# Patient Record
Sex: MALE | Race: WHITE | Employment: STUDENT | URBAN - METROPOLITAN AREA
[De-identification: names, ages, dates, MRNs, and addresses within clinical notes are randomized per-mention and may not be internally consistent; named-entity substitution may affect disease eponyms.]

---
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
## 2021-01-03 ENCOUNTER — HOSPITAL ENCOUNTER (OUTPATIENT)
Age: 21
Discharge: HOME | End: 2021-01-03
Attending: EMERGENCY MEDICINE
Payer: COMMERCIAL

## 2021-01-03 VITALS
RESPIRATION RATE: 20 BRPM | DIASTOLIC BLOOD PRESSURE: 87 MMHG | OXYGEN SATURATION: 98 % | SYSTOLIC BLOOD PRESSURE: 118 MMHG | HEART RATE: 78 BPM | TEMPERATURE: 99 F

## 2021-01-03 DIAGNOSIS — Z20.822 EXPOSURE TO COVID-19 VIRUS: Primary | ICD-10-CM

## 2021-01-03 PROCEDURE — 99213 OFFICE O/P EST LOW 20 MIN: CPT

## 2021-01-03 PROCEDURE — 99203 OFFICE O/P NEW LOW 30 MIN: CPT

## 2021-01-05 LAB — SARS-COV-2 RNA,QUAL, RT-PCR: NOT DETECTED

## 2021-10-05 ENCOUNTER — IMMUNIZATION (OUTPATIENT)
Dept: LAB | Facility: HOSPITAL | Age: 21
End: 2021-10-05
Attending: EMERGENCY MEDICINE
Payer: COMMERCIAL

## 2021-10-05 ENCOUNTER — OFFICE VISIT (OUTPATIENT)
Dept: INTERNAL MEDICINE CLINIC | Facility: CLINIC | Age: 21
End: 2021-10-05

## 2021-10-05 VITALS
DIASTOLIC BLOOD PRESSURE: 72 MMHG | WEIGHT: 139 LBS | HEART RATE: 94 BPM | TEMPERATURE: 99 F | SYSTOLIC BLOOD PRESSURE: 100 MMHG | BODY MASS INDEX: 20.83 KG/M2 | HEIGHT: 68.5 IN | OXYGEN SATURATION: 98 %

## 2021-10-05 DIAGNOSIS — Z00.00 GENERAL MEDICAL EXAM: Primary | ICD-10-CM

## 2021-10-05 DIAGNOSIS — Z23 NEED FOR COVID-19 VACCINE: ICD-10-CM

## 2021-10-05 DIAGNOSIS — Z23 NEED FOR VACCINATION: Primary | ICD-10-CM

## 2021-10-05 DIAGNOSIS — F98.8 ATTENTION DEFICIT DISORDER, UNSPECIFIED HYPERACTIVITY PRESENCE: ICD-10-CM

## 2021-10-05 DIAGNOSIS — Z86.16 HISTORY OF COVID-19: ICD-10-CM

## 2021-10-05 PROCEDURE — 3078F DIAST BP <80 MM HG: CPT | Performed by: INTERNAL MEDICINE

## 2021-10-05 PROCEDURE — 85025 COMPLETE CBC W/AUTO DIFF WBC: CPT | Performed by: INTERNAL MEDICINE

## 2021-10-05 PROCEDURE — 80061 LIPID PANEL: CPT | Performed by: INTERNAL MEDICINE

## 2021-10-05 PROCEDURE — 99395 PREV VISIT EST AGE 18-39: CPT | Performed by: INTERNAL MEDICINE

## 2021-10-05 PROCEDURE — 84443 ASSAY THYROID STIM HORMONE: CPT | Performed by: INTERNAL MEDICINE

## 2021-10-05 PROCEDURE — 0001A SARSCOV2 VAC 30MCG/0.3ML IM: CPT

## 2021-10-05 PROCEDURE — 80053 COMPREHEN METABOLIC PANEL: CPT | Performed by: INTERNAL MEDICINE

## 2021-10-05 PROCEDURE — 3008F BODY MASS INDEX DOCD: CPT | Performed by: INTERNAL MEDICINE

## 2021-10-05 PROCEDURE — 3074F SYST BP LT 130 MM HG: CPT | Performed by: INTERNAL MEDICINE

## 2021-10-05 NOTE — PROGRESS NOTES
HPI:    Patient ID: Tawana Mills is a 22year old male.     HPI      Concern of ADD    !  b  2 Cc    Grades ok    Loss weight. covid 1/21  Not able to taste    No taste    Pizza taste horrible    needs MVI     Plays basketball 3-4 c     BM normal      No Referrals:  None            Patient affirmed understanding of plan and all questions were answered  This billing was  spent on reviewing old records, labs, medications, radiology tests  and decision making.   Appropriate medical decision-making and tests ar

## 2021-10-05 NOTE — PROGRESS NOTES
Jhoana Masters is a 22year old male who presents for a complete physical exam.     HPI:       Emmitt Boas  is concerned of attention deficit disorder. His grades in college had been Bs and Cs. He endorses lack of concentration and easily gets distracted.   No pro feels well otherwise  SKIN: denies any unusual skin lesions  EYES:denies blurred vision or double vision  HEENT: denies nasal congestion, sinus pain , poor taste  LUNGS: denies shortness of breath with exertion  CARDIOVASCULAR: denies chest pain on exertio asked to return fo f/u in 6 mos and  CPX in 1 year.

## 2021-10-06 PROBLEM — F98.8 ATTENTION DEFICIT DISORDER: Status: ACTIVE | Noted: 2021-10-06

## 2021-10-06 PROBLEM — Z86.16 HISTORY OF COVID-19: Status: ACTIVE | Noted: 2021-10-06

## 2021-10-06 NOTE — PATIENT INSTRUCTIONS
PATIENT INSTRUCTIONS – INITIAL PFIZER VACCINE DOSE      Next Steps     Second Dose Appointment    Congratulations on receiving your first dose of the Karan Hernandez COVID-19 vaccine. Your second vaccine dose is due 21 days from the first dose.   Please make that f Authorization (EUA) of the Pfizer-BioNTech COVID-19 vaccine to prevent coronavirus Disease 2019 (COVID-19).     [email or web address]    Participation in Post Vaccine Monitoring & Reporting     You received the Pfizer-BioNTech COVID-19 v someone with COVID-19  • Following any applicable workplace or school guidance  • Public health recommendations for vaccinated persons    Doretha      Aerobic Exercise for a Healthy Heart  Exercise is a lot more Always follow your healthcare professional's instructions.

## 2021-10-26 ENCOUNTER — IMMUNIZATION (OUTPATIENT)
Dept: LAB | Facility: HOSPITAL | Age: 21
End: 2021-10-26
Attending: EMERGENCY MEDICINE
Payer: COMMERCIAL

## 2021-10-26 DIAGNOSIS — Z23 NEED FOR VACCINATION: Primary | ICD-10-CM

## 2021-10-26 PROCEDURE — 0002A SARSCOV2 VAC 30MCG/0.3ML IM: CPT

## 2022-01-01 NOTE — ED PROVIDER NOTES
Patient Seen in: [facility]      History   Patient presents with:  Testing    Stated Complaint: Covid 19 test    HPI/Subjective:   HPI    25 yo male with close exposure to COVID in a household contact. He is asymptomatic.      Objective:   Pas General: Skin is warm and dry. Capillary Refill: Capillary refill takes less than 2 seconds. Neurological:      General: No focal deficit present. Mental Status: He is alert. Sensory: No sensory deficit.    Psychiatric:         Mood and Aff Normal vision: sees adequately in most situations; can see medication labels, newsprint

## 2022-05-17 ENCOUNTER — HOSPITAL ENCOUNTER (OUTPATIENT)
Age: 22
Discharge: HOME | End: 2022-05-17
Attending: EMERGENCY MEDICINE
Payer: COMMERCIAL

## 2022-05-17 VITALS
OXYGEN SATURATION: 100 % | SYSTOLIC BLOOD PRESSURE: 122 MMHG | TEMPERATURE: 98 F | HEART RATE: 66 BPM | RESPIRATION RATE: 16 BRPM | DIASTOLIC BLOOD PRESSURE: 59 MMHG

## 2022-05-17 DIAGNOSIS — J02.9 VIRAL PHARYNGITIS: Primary | ICD-10-CM

## 2022-05-17 LAB
S PYO AG THROAT QL: NEGATIVE
SARS-COV-2 RNA RESP QL NAA+PROBE: NOT DETECTED

## 2022-05-17 PROCEDURE — 99213 OFFICE O/P EST LOW 20 MIN: CPT

## 2022-05-17 PROCEDURE — 87880 STREP A ASSAY W/OPTIC: CPT

## 2022-07-19 ENCOUNTER — TELEPHONE (OUTPATIENT)
Dept: PEDIATRICS CLINIC | Facility: CLINIC | Age: 22
End: 2022-07-19

## 2022-07-19 NOTE — TELEPHONE ENCOUNTER
Concussion office visit with Dr Kimmy Gonsalez 10/20/2015 ? Call attempt to parent. No answer.  Voicemail is full, not accepting new messages

## 2022-07-19 NOTE — TELEPHONE ENCOUNTER
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  Please advise

## 2022-07-22 NOTE — TELEPHONE ENCOUNTER
To Dr. Harrison Brewer for review, patient requesting letter stating that he had a concussion 10/2015    Last [address] 1/8/019 with [facility]    Mom contacted regarding phone room staff message   Mom states patient will be entering the [address] [place] [address] in the next few weeks  Mom signed consent form for the [employer] to be able to access patient's medical records through \"[facility]\"   [address] [place] [address] is telling mom patient has records for \"concussion\" from 11/5/2017    Patient seen in office on 10/20/2015 for concussion  Sports physical from walk-in clinic from 11/5/2017 has concussion listed under [address]; no other encounters for 11/5/2017    Mom states [address] [place] [address] is requesting a note from Frank that patient was never seen in office on 11/5/2017 for a concussion, seen 10/20/2015 for concussion. Please review and advise - ok to create note?

## 2022-08-03 ENCOUNTER — TELEPHONE (OUTPATIENT)
Dept: INTERNAL MEDICINE CLINIC | Facility: CLINIC | Age: 22
End: 2022-08-03

## 2022-08-03 NOTE — TELEPHONE ENCOUNTER
Mother left msg stating patient is enlisting in the [employer] and needs forms to be fill out by pcp. Left voice msg in patient phone asking him to call the office in regards to msg above. Dr. Sanchez Varghese retired 04/2022 and last office visit was  10/05/2021 patient needs to establish care with new pcp.

## 2022-08-17 ENCOUNTER — OFFICE VISIT (OUTPATIENT)
Dept: INTERNAL MEDICINE CLINIC | Facility: CLINIC | Age: 22
End: 2022-08-17
Payer: COMMERCIAL

## 2022-08-17 ENCOUNTER — TELEPHONE (OUTPATIENT)
Dept: INTERNAL MEDICINE CLINIC | Facility: CLINIC | Age: 22
End: 2022-08-17

## 2022-08-17 VITALS
OXYGEN SATURATION: 98 % | SYSTOLIC BLOOD PRESSURE: 110 MMHG | HEART RATE: 62 BPM | TEMPERATURE: 98 F | WEIGHT: 146 LBS | BODY MASS INDEX: 21.38 KG/M2 | DIASTOLIC BLOOD PRESSURE: 70 MMHG | HEIGHT: 69.25 IN

## 2022-08-17 DIAGNOSIS — Z02.1 PHYSICAL EXAM, PRE-EMPLOYMENT: Primary | ICD-10-CM

## 2022-08-17 PROCEDURE — 99214 OFFICE O/P EST MOD 30 MIN: CPT | Performed by: INTERNAL MEDICINE

## 2022-08-17 PROCEDURE — 3074F SYST BP LT 130 MM HG: CPT | Performed by: INTERNAL MEDICINE

## 2022-08-17 PROCEDURE — 3078F DIAST BP <80 MM HG: CPT | Performed by: INTERNAL MEDICINE

## 2022-08-17 PROCEDURE — 3008F BODY MASS INDEX DOCD: CPT | Performed by: INTERNAL MEDICINE

## 2022-09-15 NOTE — TELEPHONE ENCOUNTER
Patient establishing care with Dr. Shayla Palma on 8/18/2022. Please change patients PCP from Dr. Ryne Jerez to Dr. Shayla Palma.

## 2022-12-17 ENCOUNTER — HOSPITAL ENCOUNTER (OUTPATIENT)
Age: 22
Discharge: HOME | End: 2022-12-17
Attending: EMERGENCY MEDICINE
Payer: COMMERCIAL

## 2022-12-17 ENCOUNTER — APPOINTMENT (OUTPATIENT)
Dept: GENERAL RADIOLOGY | Age: 22
End: 2022-12-17
Attending: EMERGENCY MEDICINE
Payer: COMMERCIAL

## 2022-12-17 VITALS
HEIGHT: 70 IN | HEART RATE: 68 BPM | BODY MASS INDEX: 21.47 KG/M2 | TEMPERATURE: 98 F | RESPIRATION RATE: 20 BRPM | OXYGEN SATURATION: 100 % | SYSTOLIC BLOOD PRESSURE: 122 MMHG | DIASTOLIC BLOOD PRESSURE: 62 MMHG | WEIGHT: 150 LBS

## 2022-12-17 DIAGNOSIS — S63.650A SPRAIN OF METACARPOPHALANGEAL (MCP) JOINT OF RIGHT INDEX FINGER, INITIAL ENCOUNTER: Primary | ICD-10-CM

## 2022-12-17 PROCEDURE — 99213 OFFICE O/P EST LOW 20 MIN: CPT

## 2022-12-17 PROCEDURE — 73140 X-RAY EXAM OF FINGER(S): CPT | Performed by: EMERGENCY MEDICINE

## 2022-12-17 NOTE — ED INITIAL ASSESSMENT (HPI)
Patient reports punching an object at the end of September. With continued right second finger pain. States pain increases with rom.

## 2023-05-08 ENCOUNTER — LAB ENCOUNTER (OUTPATIENT)
Dept: LAB | Facility: REFERENCE LAB | Age: 23
End: 2023-05-08
Attending: FAMILY MEDICINE
Payer: COMMERCIAL

## 2023-05-08 ENCOUNTER — OFFICE VISIT (OUTPATIENT)
Dept: INTERNAL MEDICINE CLINIC | Facility: CLINIC | Age: 23
End: 2023-05-08
Payer: COMMERCIAL

## 2023-05-08 VITALS
TEMPERATURE: 98 F | HEART RATE: 81 BPM | DIASTOLIC BLOOD PRESSURE: 74 MMHG | BODY MASS INDEX: 21.77 KG/M2 | OXYGEN SATURATION: 98 % | HEIGHT: 69 IN | SYSTOLIC BLOOD PRESSURE: 110 MMHG | WEIGHT: 147 LBS

## 2023-05-08 DIAGNOSIS — J30.2 SEASONAL ALLERGIC RHINITIS, UNSPECIFIED TRIGGER: ICD-10-CM

## 2023-05-08 DIAGNOSIS — Z00.00 WELLNESS EXAMINATION: ICD-10-CM

## 2023-05-08 DIAGNOSIS — Z00.00 WELLNESS EXAMINATION: Primary | ICD-10-CM

## 2023-05-08 LAB
ALBUMIN SERPL-MCNC: 4 G/DL (ref 3.4–5)
ALBUMIN/GLOB SERPL: 1.2 {RATIO} (ref 1–2)
ALP LIVER SERPL-CCNC: 80 U/L
ALT SERPL-CCNC: 30 U/L
ANION GAP SERPL CALC-SCNC: 3 MMOL/L (ref 0–18)
AST SERPL-CCNC: 22 U/L (ref 15–37)
BASOPHILS # BLD AUTO: 0.06 X10(3) UL (ref 0–0.2)
BASOPHILS NFR BLD AUTO: 1 %
BILIRUB SERPL-MCNC: 0.4 MG/DL (ref 0.1–2)
BILIRUB UR QL: NEGATIVE
BUN BLD-MCNC: 16 MG/DL (ref 7–18)
BUN/CREAT SERPL: 18.2 (ref 10–20)
CALCIUM BLD-MCNC: 9.1 MG/DL (ref 8.5–10.1)
CHLORIDE SERPL-SCNC: 106 MMOL/L (ref 98–112)
CHOLEST SERPL-MCNC: 135 MG/DL (ref ?–200)
CLARITY UR: CLEAR
CO2 SERPL-SCNC: 29 MMOL/L (ref 21–32)
CREAT BLD-MCNC: 0.88 MG/DL
DEPRECATED RDW RBC AUTO: 40.1 FL (ref 35.1–46.3)
EOSINOPHIL # BLD AUTO: 0.79 X10(3) UL (ref 0–0.7)
EOSINOPHIL NFR BLD AUTO: 13.3 %
ERYTHROCYTE [DISTWIDTH] IN BLOOD BY AUTOMATED COUNT: 12.2 % (ref 11–15)
EST. AVERAGE GLUCOSE BLD GHB EST-MCNC: 108 MG/DL (ref 68–126)
FASTING PATIENT LIPID ANSWER: NO
FASTING STATUS PATIENT QL REPORTED: NO
GFR SERPLBLD BASED ON 1.73 SQ M-ARVRAT: 125 ML/MIN/1.73M2 (ref 60–?)
GLOBULIN PLAS-MCNC: 3.3 G/DL (ref 2.8–4.4)
GLUCOSE BLD-MCNC: 87 MG/DL (ref 70–99)
GLUCOSE UR-MCNC: NORMAL MG/DL
HBA1C MFR BLD: 5.4 % (ref ?–5.7)
HCT VFR BLD AUTO: 43.4 %
HDLC SERPL-MCNC: 63 MG/DL (ref 40–59)
HGB BLD-MCNC: 14.5 G/DL
HGB UR QL STRIP.AUTO: NEGATIVE
IMM GRANULOCYTES # BLD AUTO: 0.01 X10(3) UL (ref 0–1)
IMM GRANULOCYTES NFR BLD: 0.2 %
KETONES UR-MCNC: NEGATIVE MG/DL
LDLC SERPL CALC-MCNC: 60 MG/DL (ref ?–100)
LEUKOCYTE ESTERASE UR QL STRIP.AUTO: NEGATIVE
LYMPHOCYTES # BLD AUTO: 1.79 X10(3) UL (ref 1–4)
LYMPHOCYTES NFR BLD AUTO: 30.1 %
MCH RBC QN AUTO: 29.7 PG (ref 26–34)
MCHC RBC AUTO-ENTMCNC: 33.4 G/DL (ref 31–37)
MCV RBC AUTO: 88.9 FL
MONOCYTES # BLD AUTO: 0.43 X10(3) UL (ref 0.1–1)
MONOCYTES NFR BLD AUTO: 7.2 %
NEUTROPHILS # BLD AUTO: 2.86 X10 (3) UL (ref 1.5–7.7)
NEUTROPHILS # BLD AUTO: 2.86 X10(3) UL (ref 1.5–7.7)
NEUTROPHILS NFR BLD AUTO: 48.2 %
NITRITE UR QL STRIP.AUTO: NEGATIVE
NONHDLC SERPL-MCNC: 72 MG/DL (ref ?–130)
OSMOLALITY SERPL CALC.SUM OF ELEC: 287 MOSM/KG (ref 275–295)
PH UR: 6 [PH] (ref 5–8)
PLATELET # BLD AUTO: 246 10(3)UL (ref 150–450)
POTASSIUM SERPL-SCNC: 3.9 MMOL/L (ref 3.5–5.1)
PROT SERPL-MCNC: 7.3 G/DL (ref 6.4–8.2)
PROT UR-MCNC: NEGATIVE MG/DL
RBC # BLD AUTO: 4.88 X10(6)UL
SODIUM SERPL-SCNC: 138 MMOL/L (ref 136–145)
SP GR UR STRIP: 1.02 (ref 1–1.03)
TRIGL SERPL-MCNC: 58 MG/DL (ref 30–149)
UROBILINOGEN UR STRIP-ACNC: NORMAL
VLDLC SERPL CALC-MCNC: 9 MG/DL (ref 0–30)
WBC # BLD AUTO: 5.9 X10(3) UL (ref 4–11)

## 2023-05-08 PROCEDURE — 80053 COMPREHEN METABOLIC PANEL: CPT

## 2023-05-08 PROCEDURE — 3008F BODY MASS INDEX DOCD: CPT | Performed by: FAMILY MEDICINE

## 2023-05-08 PROCEDURE — 3074F SYST BP LT 130 MM HG: CPT | Performed by: FAMILY MEDICINE

## 2023-05-08 PROCEDURE — 85025 COMPLETE CBC W/AUTO DIFF WBC: CPT | Performed by: FAMILY MEDICINE

## 2023-05-08 PROCEDURE — 3078F DIAST BP <80 MM HG: CPT | Performed by: FAMILY MEDICINE

## 2023-05-08 PROCEDURE — 36415 COLL VENOUS BLD VENIPUNCTURE: CPT

## 2023-05-08 PROCEDURE — 99395 PREV VISIT EST AGE 18-39: CPT | Performed by: FAMILY MEDICINE

## 2023-05-08 PROCEDURE — 83036 HEMOGLOBIN GLYCOSYLATED A1C: CPT

## 2023-05-08 PROCEDURE — 80061 LIPID PANEL: CPT

## 2023-05-08 RX ORDER — LEVOCETIRIZINE DIHYDROCHLORIDE 5 MG/1
5 TABLET, FILM COATED ORAL NIGHTLY PRN
Qty: 90 TABLET | Refills: 0 | Status: SHIPPED | OUTPATIENT
Start: 2023-05-08

## 2025-01-08 ENCOUNTER — OFFICE VISIT (OUTPATIENT)
Dept: INTERNAL MEDICINE CLINIC | Facility: CLINIC | Age: 25
End: 2025-01-08
Payer: COMMERCIAL

## 2025-01-08 VITALS
SYSTOLIC BLOOD PRESSURE: 118 MMHG | OXYGEN SATURATION: 99 % | BODY MASS INDEX: 23.7 KG/M2 | HEART RATE: 70 BPM | TEMPERATURE: 98 F | HEIGHT: 69 IN | DIASTOLIC BLOOD PRESSURE: 64 MMHG | WEIGHT: 160 LBS

## 2025-01-08 DIAGNOSIS — Z00.00 WELLNESS EXAMINATION: Primary | ICD-10-CM

## 2025-01-08 PROCEDURE — 99395 PREV VISIT EST AGE 18-39: CPT | Performed by: FAMILY MEDICINE

## 2025-01-08 PROCEDURE — 3078F DIAST BP <80 MM HG: CPT | Performed by: FAMILY MEDICINE

## 2025-01-08 PROCEDURE — 3008F BODY MASS INDEX DOCD: CPT | Performed by: FAMILY MEDICINE

## 2025-01-08 PROCEDURE — 3074F SYST BP LT 130 MM HG: CPT | Performed by: FAMILY MEDICINE

## 2025-01-08 NOTE — PROGRESS NOTES
CC: Annual Physical Exam     HPI:   Yaya Mayo is a 24 year old male who presents for a complete physical exam.     HCM  -Diet:  Well-balanced with occasional fast food  -Exercise: regularly  -Mental Health: denies any depression or anxiety sx  -Skin care:  no concerning lesions/moles      Wt Readings from Last 6 Encounters:   01/08/25 160 lb (72.6 kg)   05/08/23 147 lb (66.7 kg)   12/17/22 150 lb (68 kg)   08/17/22 146 lb (66.2 kg)   10/05/21 139 lb (63 kg)   03/13/20 144 lb (65.3 kg) (39%, Z= -0.28)*     * Growth percentiles are based on CDC (Boys, 2-20 Years) data.     Body mass index is 23.63 kg/m².     Results for orders placed or performed in visit on 05/08/23   Lipid Panel    Collection Time: 05/08/23 11:47 AM   Result Value Ref Range    Cholesterol, Total 135 <200 mg/dL    HDL Cholesterol 63 (H) 40 - 59 mg/dL    Triglycerides 58 30 - 149 mg/dL    LDL Cholesterol 60 <100 mg/dL    VLDL 9 0 - 30 mg/dL    Non HDL Chol 72 <130 mg/dL    Patient Fasting for Lipid? No    Hemoglobin A1C    Collection Time: 05/08/23 11:47 AM   Result Value Ref Range    HgbA1C 5.4 <5.7 %    Estimated Average Glucose 108 68 - 126 mg/dL   Comp Metabolic Panel (14)    Collection Time: 05/08/23 11:47 AM   Result Value Ref Range    Glucose 87 70 - 99 mg/dL    Sodium 138 136 - 145 mmol/L    Potassium 3.9 3.5 - 5.1 mmol/L    Chloride 106 98 - 112 mmol/L    CO2 29.0 21.0 - 32.0 mmol/L    Anion Gap 3 0 - 18 mmol/L    BUN 16 7 - 18 mg/dL    Creatinine 0.88 0.70 - 1.30 mg/dL    BUN/CREA Ratio 18.2 10.0 - 20.0    Calcium, Total 9.1 8.5 - 10.1 mg/dL    Calculated Osmolality 287 275 - 295 mOsm/kg    eGFR-Cr 125 >=60 mL/min/1.73m2    ALT 30 16 - 61 U/L    AST 22 15 - 37 U/L    Alkaline Phosphatase 80 45 - 117 U/L    Bilirubin, Total 0.4 0.1 - 2.0 mg/dL    Total Protein 7.3 6.4 - 8.2 g/dL    Albumin 4.0 3.4 - 5.0 g/dL    Globulin  3.3 2.8 - 4.4 g/dL    A/G Ratio 1.2 1.0 - 2.0    Patient Fasting for CMP? No    Urinalysis, Routine     Collection Time: 05/08/23 11:47 AM   Result Value Ref Range    Urine Color Light-Yellow Yellow    Clarity Urine Clear Clear    Spec Gravity 1.018 1.005 - 1.030    Glucose Urine Normal Normal mg/dL    Bilirubin Urine Negative Negative    Ketones Urine Negative Negative mg/dL    Blood Urine Negative Negative    pH Urine 6.0 5.0 - 8.0    Protein Urine Negative Negative, Trace mg/dL    Urobilinogen Urine Normal Normal    Nitrite Urine Negative Negative    Leukocyte Esterase Urine Negative Negative    WBC Urine 1-5 0 - 5 /HPF    RBC Urine None Seen 0 - 2 /HPF    Bacteria Urine None Seen None Seen /HPF         No current outpatient medications on file.      Allergies[1]   Past Medical History:    Ankle sprain    Right    Elbow injury    Left    Head concussion      Past Surgical History:   Procedure Laterality Date    Adenoidectomy      Tonsillectomy        Family History   Problem Relation Age of Onset    Gastro-Intestinal Disorder Father         GERD    Diabetes Maternal Grandmother     Cancer Maternal Grandfather         mesothelioma; lung cancer form job exposure    Asthma Neg         family h/o      Social History:  Social History     Socioeconomic History    Marital status: Single   Tobacco Use    Smoking status: Never    Smokeless tobacco: Never   Substance and Sexual Activity    Alcohol use: Never     Alcohol/week: 0.0 standard drinks of alcohol    Drug use: Never   Social History Narrative    School:        Grade:7th        Sports:cross- country                 REVIEW OF SYSTEMS:   Review of Systems   Constitutional:  Negative for fatigue and fever.   Respiratory:  Negative for cough and shortness of breath.    Cardiovascular:  Negative for chest pain, palpitations and leg swelling.   Gastrointestinal:  Negative for abdominal pain, constipation, diarrhea and vomiting.   Musculoskeletal:  Negative for arthralgias.   Neurological:  Negative for headaches.            PHYSICAL EXAM:   /64   Pulse 70   Temp  98.2 °F (36.8 °C)   Ht 5' 9\" (1.753 m)   Wt 160 lb (72.6 kg)   SpO2 99%   BMI 23.63 kg/m²  Estimated body mass index is 23.63 kg/m² as calculated from the following:    Height as of this encounter: 5' 9\" (1.753 m).    Weight as of this encounter: 160 lb (72.6 kg).     Wt Readings from Last 3 Encounters:   01/08/25 160 lb (72.6 kg)   05/08/23 147 lb (66.7 kg)   12/17/22 150 lb (68 kg)       Physical Exam  Vitals reviewed.   Constitutional:       General: He is not in acute distress.     Appearance: He is well-developed.   HENT:      Head: Normocephalic.      Right Ear: Tympanic membrane and external ear normal.      Left Ear: Tympanic membrane and external ear normal.   Eyes:      Conjunctiva/sclera: Conjunctivae normal.      Pupils: Pupils are equal, round, and reactive to light.   Neck:      Thyroid: No thyromegaly.   Cardiovascular:      Rate and Rhythm: Normal rate and regular rhythm.      Heart sounds: Normal heart sounds. No murmur heard.  Pulmonary:      Effort: Pulmonary effort is normal. No respiratory distress.      Breath sounds: Normal breath sounds.   Abdominal:      General: Bowel sounds are normal. There is no distension.      Palpations: Abdomen is soft.      Tenderness: There is no abdominal tenderness.   Musculoskeletal:         General: Normal range of motion.      Cervical back: Normal range of motion and neck supple.      Right lower leg: No edema.      Left lower leg: No edema.   Skin:     Findings: No rash.   Neurological:      General: No focal deficit present.      Cranial Nerves: No cranial nerve deficit.           ASSESSMENT AND PLAN:   Yaya Mayo is a 24 year old male who presents for a complete physical exam.       Health maintenance, will check:   Orders Placed This Encounter   Procedures    CBC With Differential With Platelet    Comp Metabolic Panel (14)    Hemoglobin A1C    Lipid Panel    TSH W Reflex To Free T4       Diagnoses and all orders for this  visit:    Wellness examination  -     CBC With Differential With Platelet  -     Comp Metabolic Panel (14); Future  -     Hemoglobin A1C; Future  -     Lipid Panel; Future  -     TSH W Reflex To Free T4; Future     -     Pt reassured and all questions answered.  -     Age/sex specific preventive measures/immunizations reviewed and discussed with pt.  -     Pt counseled with regards to diet and exercise.        Health Maintenance Due   Topic Date Due    Meningococcal B Vaccine (1 of 2 - Standard) Never done    DTaP,Tdap,and Td Vaccines (7 - Td or Tdap) 10/16/2023    Annual Physical  05/08/2024    COVID-19 Vaccine (3 - 2024-25 season) 09/01/2024         The patient indicates understanding of these issues and agrees to the plan.  Return if symptoms worsen or fail to improve.  Reasurrance and education provided. All questions answered. Red flags/ ER precautions discussed.       [1] No Known Allergies

## 2025-03-13 ENCOUNTER — PATIENT MESSAGE (OUTPATIENT)
Age: 25
End: 2025-03-13

## (undated) NOTE — LETTER
Date & Time: 12/19/2018, 6:15 PM  Patient: Alisa Lindquist  Encounter Provider(s):    Trinity Magana MD       To Whom It May Concern:    Alisa Lindquist was seen and treated in our department on 12/19/2018.  He should not return to school until 12/21/2

## (undated) NOTE — MR AVS SNAPSHOT
[place]  [address]  [phone]               Thank you for choosing us for your health care visit with Slime Cai.  Kevon Hernandez MD.  We are glad to serve you and happy to provide you with this summ may be held responsible for payment in full if proper authorization is not acquired. Please contact the Patient Business Office at [phone] if you have any questions related to insurance coverage. Thank you.          Reason for Today's Visit     [facility] MyChart Questions? Call [phone] for help. MyChart is NOT to be used for urgent needs. For medical emergencies, dial 911.             Educational Information     Healthy Active Living  An initiative of the American Academy of Pediatrics    Fact S Help your children form healthy habits. Healthy active children are more likely to be healthy active adults!              Visit [facility] online at  [employer]tn

## (undated) NOTE — LETTER
7/22/2022              Nubia Combs        [facility] [address]         To Whom It May Concern,    This is to certify that Elsa Carey had a concussion Oct 2015; he has not had issues with it since. It might be listed under \"Past Medical History\" on some of his notes but it is not a current issue. Specifically, he was seen 11/5/2017 for a sports physical, not for concussion.      Sincerely,      Portia López MD  [address] [place] [address]  [phone]        Document electronically generated by:  Portia López MD

## (undated) NOTE — LETTER
2/13/2018              [address] [facility]         [address] [address]         To Whom It May Concern,    Josefa Callas for leaving early on 2/12 and missing school 2/13 due to illness; I think that he will be able to return 2/14.

## (undated) NOTE — LETTER
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  MEDICATION  (List all prescrib /71   Ht 5' 8.5\" (1.74 m)   Wt 62.1 kg (137 lb)   BMI 20.53 kg/m²     DIABETES SCREENING  BMI>85% age/sex  No And any two of the following:  Family History No    Ethnic Minority  No          Signs of Insulin Resistance (hypertension, dyslipidemia, p Currently Prescribed Asthma Medication:            Quick-relief  medication (e.g. Short Acting Beta Antagonist): No          Controller medication (e.g. inhaled corticosteroid):   No Other   NEEDS/MODIFICATIONS required in the school setting  None DIET

## (undated) NOTE — LETTER
VACCINE ADMINISTRATION RECORD  PARENT / GUARDIAN APPROVAL  Date: 12/3/2018  Vaccine administered to: Gabi De La Paz     : 2000    MRN: [id number]    A copy of the appropriate Centers for Disease Control and Prevention Vaccine Information statement

## (undated) NOTE — LETTER
VACCINE ADMINISTRATION RECORD  PARENT / GUARDIAN APPROVAL  Date: 2019  Vaccine administered to: Cain March     : 2000    MRN: [id number]    A copy of the appropriate Centers for Disease Control and Prevention Vaccine Information statement has

## (undated) NOTE — LETTER
10/11/2019              Brenden Dixon        [address] [place] [address] [place] [id number]       Immunization History   Administered Date(s) Administered   • DTAP 11/08/2001, 01/17/2002, 03/21/2002, 10/02/2002, 07/21/2004, 11/30/2005   • FLULAVAL 6

## (undated) NOTE — LETTER
Date: 8/17/2022    Patient Name: Angeles Reddy          To Whom it may concern: This letter has been written at the patient's request. The above patient was seen at the [facility] [place] for treatment of a medical condition. With his permission I am disclosing that he had a back injury in 2018 which was evaluated by specialist including orthopedic surgeon and physiatrist.  He completed recommended physical therapy and was cleared to return to normal activity in 2019. Since then he has not had any recurrence of symptoms associated with his previous injury. He continues to work our and partake in physical activity with no abnormal symptoms. The patient may participate in [employer] activity with no restrictions.         Sincerely,            May Matos MD

## (undated) NOTE — LETTER
Name:  Edison Pretty Year:  11th Grade Class: Student ID No.:   Address:  [facility] [address] [id number] Phone:  [phone] (home)  :  17year old   Name Relationship [address] Work Phone Home Phone Mobile Phone   1.  RAVI LAZO 12. Has anyone in your family had unexplained fainting, seizures, or near drowning? BONE AND JOINT QUESTIONS Yes No   17. Have you ever had an injury to a bone, muscle, ligament, or tendon that caused you to miss a practice or a game?      18. Have you /fall?     36. Have you ever become ill while exercising in the heat?     41. Do you get frequent muscle cramps when exercising? 42. Do you or someone in your family have sickle cell trait or disease? 43.  Have you ever had any problems with your ey Genitourinary (males only)* Yes    Skin:  HSV, lesions suggestive of MRSA, tinea corporis Yes    Neurologic* Yes    MUSCULOSKELETAL     Neck Yes    Back Yes    Shoulder/arm Yes    Elbow/forearm Yes    Wrist/hand/fingers Yes    Hip/thigh Yes    Knee Yes state series events or during the school day, and I/our student do/does hereby agree to submit to such testing and analysis by a certified laboratory.  We further understand and agree that the results of the performance-enhancing substance testing may be pr

## (undated) NOTE — LETTER
Patient Name: Shakira Painting  YOB: 2000          MRN number:  [id number]  Date:  6/8/2018  Referring Physician: Donte Santamaria     LUMBAR SPINE EVALUATION:    Referring Physician: Dr. Troy Barillas  Date of Onset: March 2018 onset; 6/4/18 Inga Garcia Alan Code would benefit from skilled Physical Therapy to address the above impairments to decrease R thoracic and lumbar pain, increase mobility and flexibility, increase strength of lumbar and thoracic stabilizers, decrease abnormal resting tone of R thoracic I certify the need for these services furnished under this plan of treatment and while under my care.     X______________________________________________ Date________________  Certification From: 7/4/4516      To: 9/5/2018

## (undated) NOTE — MR AVS SNAPSHOT
[place]  [address]  [phone]               Thank you for choosing us for your health care visit with Mariza Tam MD.  We are glad to serve you and happy to provide you with this summa their recent   visit, view other health information and more. To sign up or find more information on getting   Proxy Access to your child’s MyChart go to [email or web address] and click on the   Sign Up Forms link in the Additional Information box o Eating low-fat dairy products like yogurt, milk, and cheese  o Regularly eating meals together as a family  o Limiting fast food, take out food, and eating out at restaurants  o Preparing foods at home as a family  o Eating a diet rich in calcium  o [address] [place] [address]